# Patient Record
(demographics unavailable — no encounter records)

---

## 2024-10-29 NOTE — ASSESSMENT
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  She has been on it and wishes to continue on it  Routine follow-up planned in 6 months, sooner should the need arise

## 2024-10-29 NOTE — PHYSICAL EXAM
[General Appearance - Alert] : alert [General Appearance - In No Acute Distress] : in no acute distress [General Appearance - Well-Appearing] : healthy appearing [Impaired Insight] : insight and judgment were intact [Affect] : the affect was normal [Person] : oriented to person [Place] : oriented to place [Time] : disoriented to time [Short Term Intact] : short term memory impaired [Remote Intact] : remote memory impaired [Registration Intact] : recent registration memory intact [Concentration Intact] : normal concentrating ability [Visual Intact] : visual attention was ~T not ~L decreased [Naming Objects] : no difficulty naming common objects [Repeating Phrases] : no difficulty repeating a phrase [Fluency] : fluency intact [Comprehension] : comprehension intact [Past History] : adequate knowledge of personal past history [Cranial Nerves Optic (II)] : visual acuity intact bilaterally,  visual fields full to confrontation, pupils equal round and reactive to light [Cranial Nerves Oculomotor (III)] : extraocular motion intact [Cranial Nerves Trigeminal (V)] : facial sensation intact symmetrically [Cranial Nerves Facial (VII)] : face symmetrical [Cranial Nerves Vestibulocochlear (VIII)] : hearing was intact bilaterally [Cranial Nerves Glossopharyngeal (IX)] : tongue and palate midline [Cranial Nerves Accessory (XI - Cranial And Spinal)] : head turning and shoulder shrug symmetric [Cranial Nerves Hypoglossal (XII)] : there was no tongue deviation with protrusion [Motor Tone] : muscle tone was normal in all four extremities [Motor Strength] : muscle strength was normal in all four extremities [No Muscle Atrophy] : normal bulk in all four extremities [Paresis Pronator Drift Right-Sided] : no pronator drift on the right [Paresis Pronator Drift Left-Sided] : no pronator drift on the left [Sensation Tactile Decrease] : light touch was intact [Sensation Pain / Temperature Decrease] : pain and temperature was intact [Romberg's Sign] : Romberg's sign was negtive [Past-pointing] : there was no past-pointing [Tremor] : no tremor present [Coordination - Dysmetria Impaired Finger-to-Nose Bilateral] : not present [2+] : Patella left 2+ [FreeTextEntry4] : Oriented to month but not year, short-term recall 1/3 [FreeTextEntry8] : Gait mildly unsteady but ambulates fairly well independently [PERRL With Normal Accommodation] : pupils were equal in size, round, reactive to light, with normal accommodation [Extraocular Movements] : extraocular movements were intact [Full Visual Field] : full visual field

## 2024-10-29 NOTE — HISTORY OF PRESENT ILLNESS
[FreeTextEntry1] : This 54-year-old woman was seen in neurological consultation today accompanied by her mother [employer] records that they bring with them as well as prior hospital records available to review On 10/4/2021 she presented to [facility] with change in mental status and neurological deficit post flu vaccine She was diagnosed with acute disseminated encephalomyelitis and treated with steroids and intravenous gammaglobulin She went for extensive therapy including physical therapy, speech therapy, Occupational Therapy, and psychological therapy She is now done with all therapy and has had a reasonably good recovery Her main issue is cognitive deficits specifically with regard to short-term memory and some mild imbalance She has been following with another neurologist but is switching to our practice She is on donepezil 5 mg a day which she has been on for a few years Also on Effexor  Currently without any depressive symptoms  Medical history otherwise significant for hypertension and hyperlipidemia  Former smoker, no alcohol use, she is on disability

## 2025-01-28 NOTE — CARDIOLOGY SUMMARY
[de-identified] : 1 28 2025: Sinus Rhythm  -cannot rule out Old inferior infarct  -Nonspecific T-abnormality. ABNORMAL

## 2025-01-28 NOTE — DISCUSSION/SUMMARY
[Patient] : the patient [Risks] : risks [Benefits] : benefits [Alternatives] : alternatives [With Me] : with me [___ Month(s)] : in [unfilled] month(s) [FreeTextEntry1] : This is a 55 year old woman with obesity,  history of Acute dissemintaed encephaomyelitis, (2021),  asthma,  hypertension , here with [facility] care.    1)  coronary artery disease evaluation:  stress test .  cannot exercise, will do medicine stress test .  pharmaoclogical. intermediate risk factors for coronary artery disease.   echocardiogram with contrast if needed.   Nuclear stress test with IV technetium radiotracer.   2) snoring : sleep study at home.  if positive then terrie order zepbound 3) hypertension :  ct current meds.  4) obesity: prior insurance rejecitons.  if sleep apnea.  w then wi ll ordere zepbound.   [EKG obtained to assist in diagnosis and management of assessed problem(s)] : EKG obtained to assist in diagnosis and management of assessed problem(s)

## 2025-01-28 NOTE — HISTORY OF PRESENT ILLNESS
[FreeTextEntry1] : reason :    HPI for today:  1 28 2025: This is a 55 year old woman with obesity,  history of Acute dissemintaed encephaomyelitis, (2021),  asthma,  hypertension , [facility]   denies any chest pain . + dyspnea on exertion  no palppitaitons.  NO dizszines.  she does have snoring.    No smoking. No alcohol. No drugs.  Smoking:   Quit:  4 years ago    Smoked for 5-6 years.   Smoked <  1 pack a day for 5-6  years.  Family history:  Father:  no myocardial infarction. no Cerebro vascular accident  Mother :  no myocardial infarction. No cerebro vascualr accident Siblings:  No myocardial infarction.  No CVA

## 2025-04-29 NOTE — HISTORY OF PRESENT ILLNESS
[FreeTextEntry1] : I saw her initially 10/29/2024 with the following history. This 54-year-old woman was seen in neurological consultation today accompanied by her mother [employer] records that they bring with them as well as prior hospital records available to review On 10/4/2021 she presented to [facility] with change in mental status and neurological deficit post flu vaccine She was diagnosed with acute disseminated encephalomyelitis and treated with steroids and intravenous gammaglobulin She went for extensive therapy including physical therapy, speech therapy, Occupational Therapy, and psychological therapy She is now done with all therapy and has had a reasonably good recovery Her main issue is cognitive deficits specifically with regard to short-term memory and some mild imbalance She has been following with another neurologist but is switching to our practice She is on donepezil 5 mg a day which she has been on for a few years Also on Effexor  Currently without any depressive symptoms  Medical history otherwise significant for hypertension and hyperlipidemia  Former smoker, no alcohol use, she is on disability  She returns today for reevaluation accompanied by her mother She has remained neurologically stable No reported seizures

## 2025-04-29 NOTE — PHYSICAL EXAM
[General Appearance - Alert] : alert [General Appearance - In No Acute Distress] : in no acute distress [General Appearance - Well-Appearing] : healthy appearing [Impaired Insight] : insight and judgment were intact [Affect] : the affect was normal [Person] : oriented to person [Place] : oriented to place [Time] : oriented to time [Short Term Intact] : short term memory impaired [Remote Intact] : remote memory impaired [Registration Intact] : recent registration memory intact [Concentration Intact] : normal concentrating ability [Visual Intact] : visual attention was ~T not ~L decreased [Naming Objects] : no difficulty naming common objects [Repeating Phrases] : no difficulty repeating a phrase [Fluency] : fluency intact [Comprehension] : comprehension intact [Past History] : adequate knowledge of personal past history [Cranial Nerves Optic (II)] : visual acuity intact bilaterally,  visual fields full to confrontation, pupils equal round and reactive to light [Cranial Nerves Oculomotor (III)] : extraocular motion intact [Cranial Nerves Trigeminal (V)] : facial sensation intact symmetrically [Cranial Nerves Facial (VII)] : face symmetrical [Cranial Nerves Vestibulocochlear (VIII)] : hearing was intact bilaterally [Cranial Nerves Glossopharyngeal (IX)] : tongue and palate midline [Cranial Nerves Accessory (XI - Cranial And Spinal)] : head turning and shoulder shrug symmetric [Cranial Nerves Hypoglossal (XII)] : there was no tongue deviation with protrusion [Motor Tone] : muscle tone was normal in all four extremities [Motor Strength] : muscle strength was normal in all four extremities [No Muscle Atrophy] : normal bulk in all four extremities [Paresis Pronator Drift Right-Sided] : no pronator drift on the right [Paresis Pronator Drift Left-Sided] : no pronator drift on the left [Sensation Tactile Decrease] : light touch was intact [Sensation Pain / Temperature Decrease] : pain and temperature was intact [Romberg's Sign] : Romberg's sign was negtive [Past-pointing] : there was no past-pointing [Tremor] : no tremor present [Coordination - Dysmetria Impaired Finger-to-Nose Bilateral] : not present [2+] : Patella left 2+ [FreeTextEntry4] : Short-term recall 2/3, fully oriented today. [FreeTextEntry8] : Gait mildly unsteady but ambulates fairly well independently [PERRL With Normal Accommodation] : pupils were equal in size, round, reactive to light, with normal accommodation [Extraocular Movements] : extraocular movements were intact [Full Visual Field] : full visual field